# Patient Record
Sex: MALE | Race: BLACK OR AFRICAN AMERICAN | URBAN - METROPOLITAN AREA
[De-identification: names, ages, dates, MRNs, and addresses within clinical notes are randomized per-mention and may not be internally consistent; named-entity substitution may affect disease eponyms.]

---

## 2023-04-05 PROBLEM — 443913008: Status: ACTIVE | Noted: 2023-04-05

## 2023-04-05 PROBLEM — 197321007: Status: ACTIVE | Noted: 2023-04-05

## 2023-04-07 ENCOUNTER — OFFICE VISIT (OUTPATIENT)
Dept: URBAN - METROPOLITAN AREA CLINIC 86 | Facility: CLINIC | Age: 48
End: 2023-04-07
Payer: COMMERCIAL

## 2023-04-07 ENCOUNTER — WEB ENCOUNTER (OUTPATIENT)
Dept: URBAN - METROPOLITAN AREA CLINIC 86 | Facility: CLINIC | Age: 48
End: 2023-04-07

## 2023-04-07 VITALS
HEART RATE: 91 BPM | SYSTOLIC BLOOD PRESSURE: 164 MMHG | WEIGHT: 267 LBS | BODY MASS INDEX: 37.38 KG/M2 | DIASTOLIC BLOOD PRESSURE: 100 MMHG | TEMPERATURE: 97.2 F | HEIGHT: 71 IN

## 2023-04-07 DIAGNOSIS — Z71.85 VACCINE COUNSELING: ICD-10-CM

## 2023-04-07 DIAGNOSIS — K76.0 FATTY LIVER: ICD-10-CM

## 2023-04-07 DIAGNOSIS — R74.8 ELEVATED LIVER ENZYMES: ICD-10-CM

## 2023-04-07 PROBLEM — 707724006: Status: ACTIVE | Noted: 2023-04-07

## 2023-04-07 PROCEDURE — 99204 OFFICE O/P NEW MOD 45 MIN: CPT | Performed by: PHYSICIAN ASSISTANT

## 2023-04-07 RX ORDER — METOPROLOL SUCCINATE 100 MG/1
1 TABLET TABLET, FILM COATED, EXTENDED RELEASE ORAL ONCE A DAY
Status: ACTIVE | COMMUNITY

## 2023-04-07 RX ORDER — LISINOPRIL AND HYDROCHLOROTHIAZIDE 20; 25 MG/1; MG/1
1 TABLET TABLET ORAL ONCE A DAY
Status: ACTIVE | COMMUNITY

## 2023-04-07 NOTE — HPI-TODAY'S VISIT:
This is a 47 year old male referred by Dr. Yvette Velasco for evaluation of fatty liver. A copy of the note will be sent to the referring provider.  4/5/23 no records were sent unfortunately He also had abnormal labs  PMH includes htn, dm II, hld. He was seeing pcp and labs were normal and then went up and she referred him. He is takign lisinopril-hctz 20-25 mg, metoprolol 50 mg, atorvastatin prn , insulin, most recent activated charcol. He was taking atorvastatin he was taking at night x a few weeks. he has been prescribed this for a while but was not taking it regularly until around the time of the labs. takes advil about 2 times/ week, mens on a day mvi ETOH none Denies smoking.  A1c close to 7. Discsussed the fatty liver and treatment for this is diet exercise and weight loss. Very suspicous for the statin as started regularly taking around the time of the labs. end of visit got labs and as 106, alt 176

## 2023-04-07 NOTE — PHYSICAL EXAM HENT:
Head,  normocephalic,  atraumatic,  Face,  Face within normal limits,  Ears,  External ears within normal limits,  Nose/Nasopharynx and mouth: patient wearing mask
n/a

## 2023-04-12 ENCOUNTER — LAB OUTSIDE AN ENCOUNTER (OUTPATIENT)
Dept: URBAN - METROPOLITAN AREA CLINIC 86 | Facility: CLINIC | Age: 48
End: 2023-04-12

## 2023-04-13 LAB
ALBUMIN: 4.8
ALKALINE PHOSPHATASE: 86
ALT (SGPT): 89
AST (SGOT): 55
BILIRUBIN, DIRECT: <0.1
BILIRUBIN, TOTAL: 0.3
PROTEIN, TOTAL: 7.5

## 2023-04-17 ENCOUNTER — LAB OUTSIDE AN ENCOUNTER (OUTPATIENT)
Dept: URBAN - METROPOLITAN AREA CLINIC 86 | Facility: CLINIC | Age: 48
End: 2023-04-17

## 2023-04-21 ENCOUNTER — OFFICE VISIT (OUTPATIENT)
Dept: URBAN - METROPOLITAN AREA CLINIC 86 | Facility: CLINIC | Age: 48
End: 2023-04-21
Payer: COMMERCIAL

## 2023-04-21 VITALS — HEIGHT: 71 IN

## 2023-04-21 DIAGNOSIS — R74.8 ELEVATED LIVER ENZYMES: ICD-10-CM

## 2023-04-21 DIAGNOSIS — K76.0 FATTY LIVER: ICD-10-CM

## 2023-04-21 DIAGNOSIS — Z71.85 VACCINE COUNSELING: ICD-10-CM

## 2023-04-21 PROCEDURE — 99214 OFFICE O/P EST MOD 30 MIN: CPT | Performed by: PHYSICIAN ASSISTANT

## 2023-04-21 RX ORDER — LISINOPRIL AND HYDROCHLOROTHIAZIDE 20; 25 MG/1; MG/1
1 TABLET TABLET ORAL ONCE A DAY
COMMUNITY

## 2023-04-21 RX ORDER — METOPROLOL SUCCINATE 100 MG/1
1 TABLET TABLET, FILM COATED, EXTENDED RELEASE ORAL ONCE A DAY
COMMUNITY

## 2023-04-21 NOTE — PHYSICAL EXAM HENT:
Head, normocephalic, atraumatic, Face, Face within normal limits, Ears, External ears within normal limits, Nose/Nasopharynx and mouth: patient wearing mask

## 2023-04-21 NOTE — HPI-TODAY'S VISIT:
This is a 47 year old male referred by Dr. Yvette Velasco for evaluation of fatty liver. A copy of the note will be sent to the referring provider.   4/21/23 DId not see the labs form the first visit.  He is off the statin.  he is still taking his vitamin  He had labs don on 47/23 and ast  labs on 4/12/23/ ast 55, alt 86  labs on 4/17/23 ast 50, alt 86.  immunoglobulins normal alpha 1 normal. ferritin 422 am negative , laureen negative, asma negative, hep core negative Hapy to see the  labs are better off the medicine and his diet has improved.  Discussed that given the labs were so high and the only thing we changed was the removal of the statin that shows that it was the issue.  THey need to get the US report  recap 4/5/23 no records were sent unfortunately He also had abnormal labs  PMH includes htn, dm II, hld. He was seeing pcp and labs were normal and then went up and she referred him. He is takign lisinopril-hctz 20-25 mg, metoprolol 50 mg, atorvastatin prn , insulin, most recent activated charcol. He was taking atorvastatin he was taking at night x a few weeks. he has been prescribed this for a while but was not taking it regularly until around the time of the labs. takes advil about 2 times/ week, mens on a day mvi ETOH none Denies smoking.  A1c close to 7. Discsussed the fatty liver and treatment for this is diet exercise and weight loss. Very suspicous for the statin as started regularly taking around the time of the labs. end of visit got labs and as 106, alt 176

## 2023-04-26 LAB
A/G RATIO: 1.6
ALBUMIN: 4.6
ALKALINE PHOSPHATASE: 82
ALPHA-1-ANTITRYPSIN, SERUM: 127
ALT (SGPT): 86
ANA DIRECT: NEGATIVE
AST (SGOT): 50
BASO (ABSOLUTE): 0
BASOS: 1
BILIRUBIN, DIRECT: 0.16
BILIRUBIN, TOTAL: 0.5
BUN/CREATININE RATIO: 13
BUN: 16
CALCIUM: 9.7
CARBON DIOXIDE, TOTAL: 27
CERULOPLASMIN: 24.4
CHLORIDE: 98
CREATININE: 1.27
EGFR: 71
EOS (ABSOLUTE): 0.3
EOS: 5
FERRITIN, SERUM: 422
GLOBULIN, TOTAL: 2.9
GLUCOSE: 103
HBSAG SCREEN: NEGATIVE
HCV AB: NON REACTIVE
HEMATOCRIT: 44.6
HEMATOLOGY COMMENTS:: (no result)
HEMOGLOBIN: 14.9
HEP A AB, TOTAL: NEGATIVE
HEP B CORE AB, TOT: NEGATIVE
HEPATITIS B SURF AB QUANT: <3.1
IMMATURE CELLS: (no result)
IMMATURE GRANS (ABS): 0
IMMATURE GRANULOCYTES: 0
IMMUNOGLOBULIN A, QN, SERUM: 189
IMMUNOGLOBULIN E, TOTAL: 476
IMMUNOGLOBULIN G, QN, SERUM: 1559
IMMUNOGLOBULIN M, QN, SERUM: 32
INTERPRETATION:: (no result)
IRON BIND.CAP.(TIBC): 266
IRON SATURATION: 41
IRON: 109
LYMPHS (ABSOLUTE): 2.6
LYMPHS: 42
MCH: 27.7
MCHC: 33.4
MCV: 83
MITOCHONDRIAL (M2) ANTIBODY: <20
MONOCYTES(ABSOLUTE): 0.4
MONOCYTES: 6
NEUTROPHILS (ABSOLUTE): 2.9
NEUTROPHILS: 46
NRBC: (no result)
PHENOTYPE (PI): (no result)
PLATELETS: 317
POTASSIUM: 4.5
PROTEIN, TOTAL: 7.5
RBC: 5.38
RDW: 12.8
SODIUM: 137
UIBC: 157
WBC: 6.2

## 2023-05-19 ENCOUNTER — LAB OUTSIDE AN ENCOUNTER (OUTPATIENT)
Dept: URBAN - METROPOLITAN AREA CLINIC 86 | Facility: CLINIC | Age: 48
End: 2023-05-19

## 2023-05-20 LAB
A/G RATIO: 1.8
ALBUMIN: 4.8
ALKALINE PHOSPHATASE: 92
ALT (SGPT): 117
AST (SGOT): 88
BASO (ABSOLUTE): 0
BASOS: 0
BILIRUBIN, TOTAL: 0.4
BUN/CREATININE RATIO: 12
BUN: 18
CALCIUM: 10
CARBON DIOXIDE, TOTAL: 23
CHLORIDE: 100
CREATININE: 1.49
EGFR: 58
EOS (ABSOLUTE): 0.3
EOS: 4
GLOBULIN, TOTAL: 2.6
GLUCOSE: 113
HEMATOCRIT: 42.9
HEMATOLOGY COMMENTS:: (no result)
HEMOGLOBIN: 14.6
IMMATURE CELLS: (no result)
IMMATURE GRANS (ABS): 0
IMMATURE GRANULOCYTES: 0
LYMPHS (ABSOLUTE): 2.4
LYMPHS: 38
MCH: 28.4
MCHC: 34
MCV: 84
MONOCYTES(ABSOLUTE): 0.4
MONOCYTES: 6
NEUTROPHILS (ABSOLUTE): 3.3
NEUTROPHILS: 52
NRBC: (no result)
PLATELETS: 314
POTASSIUM: 4.3
PROTEIN, TOTAL: 7.4
RBC: 5.14
RDW: 13.5
SODIUM: 140
WBC: 6.3

## 2023-05-26 ENCOUNTER — TELEPHONE ENCOUNTER (OUTPATIENT)
Dept: URBAN - METROPOLITAN AREA CLINIC 92 | Facility: CLINIC | Age: 48
End: 2023-05-26

## 2023-06-02 ENCOUNTER — LAB OUTSIDE AN ENCOUNTER (OUTPATIENT)
Dept: URBAN - METROPOLITAN AREA CLINIC 92 | Facility: CLINIC | Age: 48
End: 2023-06-02

## 2023-06-21 ENCOUNTER — LAB OUTSIDE AN ENCOUNTER (OUTPATIENT)
Dept: URBAN - METROPOLITAN AREA CLINIC 86 | Facility: CLINIC | Age: 48
End: 2023-06-21

## 2023-07-10 ENCOUNTER — LAB OUTSIDE AN ENCOUNTER (OUTPATIENT)
Dept: URBAN - METROPOLITAN AREA CLINIC 86 | Facility: CLINIC | Age: 48
End: 2023-07-10

## 2023-07-25 ENCOUNTER — OFFICE VISIT (OUTPATIENT)
Dept: URBAN - METROPOLITAN AREA CLINIC 86 | Facility: CLINIC | Age: 48
End: 2023-07-25

## 2023-07-25 ENCOUNTER — TELEPHONE ENCOUNTER (OUTPATIENT)
Dept: URBAN - METROPOLITAN AREA CLINIC 86 | Facility: CLINIC | Age: 48
End: 2023-07-25

## 2023-07-25 RX ORDER — METOPROLOL SUCCINATE 100 MG/1
1 TABLET TABLET, FILM COATED, EXTENDED RELEASE ORAL ONCE A DAY
Status: ACTIVE | COMMUNITY

## 2023-07-25 RX ORDER — LISINOPRIL AND HYDROCHLOROTHIAZIDE 20; 25 MG/1; MG/1
1 TABLET TABLET ORAL ONCE A DAY
Status: ACTIVE | COMMUNITY

## 2023-07-25 NOTE — HPI-TODAY'S VISIT:
Dear Jerrell Lerner,  You recently missed an office visit with us on July 25, 2023.  We were seeing you due to the elevated liver enzymes.  Back in May you had labs done with us in the alkaline phosphatase is 92 and the AST was 88 and the ALT was 117 and prior to that in April the alkaline phosphatase was 86 and the AST was 55 and the ALT was 89.  We were curious as to why there was such a change as originally we thought the statin removal was helping the labs.  At the last office visit I had given you lab orders to do on June 21 and July 10 and I have not seen these results.  It is a very important that we update these labs and have you come in for an office visit.  Please contact our office at [phone] and dial [phone] from my medical assistant, Milvia. I will have her contact you as well.   Amina Mccurdy PA-C

## 2023-08-15 ENCOUNTER — OFFICE VISIT (OUTPATIENT)
Dept: URBAN - METROPOLITAN AREA CLINIC 86 | Facility: CLINIC | Age: 48
End: 2023-08-15
Payer: COMMERCIAL

## 2023-08-15 VITALS
DIASTOLIC BLOOD PRESSURE: 88 MMHG | HEART RATE: 68 BPM | TEMPERATURE: 97 F | SYSTOLIC BLOOD PRESSURE: 141 MMHG | HEIGHT: 71 IN | BODY MASS INDEX: 37.03 KG/M2 | WEIGHT: 264.5 LBS

## 2023-08-15 DIAGNOSIS — Z71.85 VACCINE COUNSELING: ICD-10-CM

## 2023-08-15 DIAGNOSIS — R74.8 ELEVATED LIVER ENZYMES: ICD-10-CM

## 2023-08-15 DIAGNOSIS — K76.0 FATTY LIVER: ICD-10-CM

## 2023-08-15 PROCEDURE — 99214 OFFICE O/P EST MOD 30 MIN: CPT | Performed by: PHYSICIAN ASSISTANT

## 2023-08-15 RX ORDER — LISINOPRIL AND HYDROCHLOROTHIAZIDE 20; 25 MG/1; MG/1
1 TABLET TABLET ORAL ONCE A DAY
Status: ACTIVE | COMMUNITY

## 2023-08-15 RX ORDER — ROSUVASTATIN CALCIUM 10 MG/1
1 TABLET TABLET, FILM COATED ORAL ONCE A DAY
Status: ACTIVE | COMMUNITY

## 2023-08-15 RX ORDER — METOPROLOL SUCCINATE 100 MG/1
1 TABLET TABLET, FILM COATED, EXTENDED RELEASE ORAL ONCE A DAY
Status: ACTIVE | COMMUNITY

## 2023-08-15 RX ORDER — METFORMIN HYDROCHLORIDE 500 MG/1
1 TABLET WITH EVENING MEAL TABLET, EXTENDED RELEASE ORAL ONCE A DAY
Status: ACTIVE | COMMUNITY

## 2023-08-15 NOTE — HPI-TODAY'S VISIT:
This is a 47 year old male referred by Dr. Yvette Velasco for evaluation of fatty liver. A copy of the note will be sent to the referring provider.  8/15/23 office visit July 10, 2023 with glucose 157, creatinine 1.18, sodium 138, potassium 4.4, bilirubin 0.4, alkaline phosphatase 79, AST 43, ALT 69.  Previously in May the AST 88 and the ALT was 117.  The total cholesterol 135, triglycerides 83, LDL 64. Hemoglobin A1c was 8 and previously 6.8 in March of this year.   4/21/23 DId not see the labs form the first visit.  He is off the statin.  he is still taking his vitamin  He had labs don on 47/23 and ast  labs on 4/12/23/ ast 55, alt 86  labs on 4/17/23 ast 50, alt 86.  immunoglobulins normal alpha 1 normal. ferritin 422 am negative , laureen negative, asma negative, hep core negative Hapy to see the  labs are better off the medicine and his diet has improved.  Discussed that given the labs were so high and the only thing we changed was the removal of the statin that shows that it was the issue.  THey need to get the US report  recap 4/5/23 no records were sent unfortunately He also had abnormal labs  PMH includes htn, dm II, hld. He was seeing pcp and labs were normal and then went up and she referred him. He is takign lisinopril-hctz 20-25 mg, metoprolol 50 mg, atorvastatin prn , insulin, most recent activated charcol. He was taking atorvastatin he was taking at night x a few weeks. he has been prescribed this for a while but was not taking it regularly until around the time of the labs. takes advil about 2 times/ week, mens on a day mvi ETOH none Denies smoking.  A1c close to 7. Discsussed the fatty liver and treatment for this is diet exercise and weight loss. Very suspicous for the statin as started regularly taking around the time of the labs. end of visit got labs and as 106, alt 176

## 2023-08-16 ENCOUNTER — TELEPHONE ENCOUNTER (OUTPATIENT)
Dept: URBAN - METROPOLITAN AREA CLINIC 86 | Facility: CLINIC | Age: 48
End: 2023-08-16

## 2023-08-16 LAB
ALBUMIN: 4.9
ALKALINE PHOSPHATASE: 87
ALT (SGPT): 85
AST (SGOT): 70
BILIRUBIN, DIRECT: 0.22
BILIRUBIN, TOTAL: 0.5
PROTEIN, TOTAL: 7.5

## 2023-08-16 NOTE — HPI-TODAY'S VISIT:
Dear Jerrell Lerner,  The 8/15/23 labs were sent to me.  The bilirubin total 0.5, alkaline phosphatase 87, AST 70, ALT 85.  Previously the AST was 43 and the ALT was 69 so curious as prior they are higher but it could be due to the blood sugar issue.  Need to really work on that as well as weight loss and diet as you have been doing and if the labs are not dropping we may need to remove the cholesterol medicine.  We will see what the next set of labs show.  Amina Mccurdy PA-C

## 2023-09-26 ENCOUNTER — LAB OUTSIDE AN ENCOUNTER (OUTPATIENT)
Dept: URBAN - METROPOLITAN AREA CLINIC 86 | Facility: CLINIC | Age: 48
End: 2023-09-26

## 2023-09-28 ENCOUNTER — LAB OUTSIDE AN ENCOUNTER (OUTPATIENT)
Dept: URBAN - METROPOLITAN AREA CLINIC 86 | Facility: CLINIC | Age: 48
End: 2023-09-28

## 2023-10-02 ENCOUNTER — OFFICE VISIT (OUTPATIENT)
Dept: URBAN - METROPOLITAN AREA CLINIC 86 | Facility: CLINIC | Age: 48
End: 2023-10-02

## 2023-10-03 ENCOUNTER — OFFICE VISIT (OUTPATIENT)
Dept: URBAN - METROPOLITAN AREA CLINIC 91 | Facility: CLINIC | Age: 48
End: 2023-10-03

## 2023-10-03 RX ORDER — METOPROLOL SUCCINATE 100 MG/1
1 TABLET TABLET, FILM COATED, EXTENDED RELEASE ORAL ONCE A DAY
Status: ACTIVE | COMMUNITY

## 2023-10-03 RX ORDER — METFORMIN HYDROCHLORIDE 500 MG/1
1 TABLET WITH EVENING MEAL TABLET, EXTENDED RELEASE ORAL ONCE A DAY
Status: ACTIVE | COMMUNITY

## 2023-10-03 RX ORDER — ROSUVASTATIN CALCIUM 10 MG/1
1 TABLET TABLET, FILM COATED ORAL ONCE A DAY
Status: ACTIVE | COMMUNITY

## 2023-10-03 RX ORDER — LISINOPRIL AND HYDROCHLOROTHIAZIDE 20; 25 MG/1; MG/1
1 TABLET TABLET ORAL ONCE A DAY
Status: ACTIVE | COMMUNITY

## 2023-10-05 ENCOUNTER — OFFICE VISIT (OUTPATIENT)
Dept: URBAN - METROPOLITAN AREA CLINIC 86 | Facility: CLINIC | Age: 48
End: 2023-10-05

## 2023-10-24 ENCOUNTER — OFFICE VISIT (OUTPATIENT)
Dept: URBAN - METROPOLITAN AREA CLINIC 86 | Facility: CLINIC | Age: 48
End: 2023-10-24
Payer: COMMERCIAL

## 2023-10-24 ENCOUNTER — OFFICE VISIT (OUTPATIENT)
Dept: URBAN - METROPOLITAN AREA CLINIC 91 | Facility: CLINIC | Age: 48
End: 2023-10-24
Payer: COMMERCIAL

## 2023-10-24 VITALS — BODY MASS INDEX: 37.1 KG/M2 | HEIGHT: 71 IN | WEIGHT: 265 LBS

## 2023-10-24 DIAGNOSIS — K76.0 FATTY LIVER: ICD-10-CM

## 2023-10-24 DIAGNOSIS — Z71.85 VACCINE COUNSELING: ICD-10-CM

## 2023-10-24 DIAGNOSIS — R74.8 ELEVATED LIVER ENZYMES: ICD-10-CM

## 2023-10-24 DIAGNOSIS — R93.2 ABNORMAL ULTRASOUND OF LIVER: ICD-10-CM

## 2023-10-24 PROCEDURE — 99214 OFFICE O/P EST MOD 30 MIN: CPT | Performed by: PHYSICIAN ASSISTANT

## 2023-10-24 PROCEDURE — 93975 VASCULAR STUDY: CPT

## 2023-10-24 PROCEDURE — 76705 ECHO EXAM OF ABDOMEN: CPT

## 2023-10-24 RX ORDER — METFORMIN HYDROCHLORIDE 500 MG/1
1 TABLET WITH EVENING MEAL TABLET, EXTENDED RELEASE ORAL ONCE A DAY
Status: ACTIVE | COMMUNITY

## 2023-10-24 RX ORDER — ROSUVASTATIN CALCIUM 10 MG/1
1 TABLET TABLET, FILM COATED ORAL ONCE A DAY
Status: ACTIVE | COMMUNITY

## 2023-10-24 RX ORDER — LISINOPRIL AND HYDROCHLOROTHIAZIDE 20; 25 MG/1; MG/1
1 TABLET TABLET ORAL ONCE A DAY
Status: ACTIVE | COMMUNITY

## 2023-10-24 RX ORDER — METOPROLOL SUCCINATE 100 MG/1
1 TABLET TABLET, FILM COATED, EXTENDED RELEASE ORAL ONCE A DAY
Status: ACTIVE | COMMUNITY

## 2023-10-24 NOTE — HPI-TODAY'S VISIT:
This is a 47 year old male referred by Dr. Yvette Velasco for evaluation of fatty liver. A copy of the note will be sent to the referring provider.  10/24/23 9/26/23 labs with cr 1.35, ast 81, alt 84, previously in august ast 70 alt 85 Denies new meds. stil on crestor discussed need to work on this  needs to have a1c rechecked US prelim showing fatty liver and needs to work on this    recap 8/15/23 office visit July 10, 2023 with glucose 157, creatinine 1.18, sodium 138, potassium 4.4, bilirubin 0.4, alkaline phosphatase 79, AST 43, ALT 69.  Previously in May the AST 88 and the ALT was 117.  The total cholesterol 135, triglycerides 83, LDL 64. Hemoglobin A1c was 8 and previously 6.8 in March of this year.   4/21/23 DId not see the labs form the first visit.  He is off the statin.  he is still taking his vitamin  He had labs don on 47/23 and ast  labs on 4/12/23/ ast 55, alt 86  labs on 4/17/23 ast 50, alt 86.  immunoglobulins normal alpha 1 normal. ferritin 422 am negative , laureen negative, asma negative, hep core negative Hapy to see the  labs are better off the medicine and his diet has improved.  Discussed that given the labs were so high and the only thing we changed was the removal of the statin that shows that it was the issue.  THey need to get the US report  recap 4/5/23 no records were sent unfortunately He also had abnormal labs  PMH includes htn, dm II, hld. He was seeing pcp and labs were normal and then went up and she referred him. He is takign lisinopril-hctz 20-25 mg, metoprolol 50 mg, atorvastatin prn , insulin, most recent activated charcol. He was taking atorvastatin he was taking at night x a few weeks. he has been prescribed this for a while but was not taking it regularly until around the time of the labs. takes advil about 2 times/ week, mens on a day mvi ETOH none Denies smoking.  A1c close to 7. Discsussed the fatty liver and treatment for this is diet exercise and weight loss. Very suspicous for the statin as started regularly taking around the time of the labs. end of visit got labs and as 106, alt 176

## 2023-10-26 ENCOUNTER — TELEPHONE ENCOUNTER (OUTPATIENT)
Dept: URBAN - METROPOLITAN AREA CLINIC 86 | Facility: CLINIC | Age: 48
End: 2023-10-26

## 2023-10-26 NOTE — HPI-TODAY'S VISIT:
Dear Jerrell Lerner,  The final ultrasound report was sent to me. The liver echogenicity appears increased therefore still showing the fatty liver. They did not see any lesions. The common bile duct is normal at 3.6 mm. The gallbladder nomral. THe right kidney normal. The Hepatic vasculature patent. Overall they saw fatty liver and as discussed really need to work on this so we can see the labs improve over time and then the imaging.  Amina Mccurdy PA-C

## 2024-01-23 ENCOUNTER — OFFICE VISIT (OUTPATIENT)
Dept: URBAN - METROPOLITAN AREA CLINIC 86 | Facility: CLINIC | Age: 49
End: 2024-01-23

## 2024-01-23 RX ORDER — METFORMIN HYDROCHLORIDE 500 MG/1
1 TABLET WITH EVENING MEAL TABLET, EXTENDED RELEASE ORAL ONCE A DAY
Status: ACTIVE | COMMUNITY

## 2024-01-23 RX ORDER — METOPROLOL SUCCINATE 100 MG/1
1 TABLET TABLET, FILM COATED, EXTENDED RELEASE ORAL ONCE A DAY
Status: ACTIVE | COMMUNITY

## 2024-01-23 RX ORDER — ROSUVASTATIN CALCIUM 10 MG/1
1 TABLET TABLET, FILM COATED ORAL ONCE A DAY
Status: ACTIVE | COMMUNITY

## 2024-01-23 RX ORDER — LISINOPRIL AND HYDROCHLOROTHIAZIDE 20; 25 MG/1; MG/1
1 TABLET TABLET ORAL ONCE A DAY
Status: ACTIVE | COMMUNITY

## 2024-01-23 NOTE — HPI-TODAY'S VISIT:
This is a 47 year old male referred by Dr. Yvette Velasco for evaluation of fatty liver. A copy of the note will be sent to the referring provider.  1/23/24 ov obtained labs from the [facility] lon. done at [facility] and i imported doc into the chart.1/2/24 labs with na 141, k 4.8, tb 0.5, alp 70, ast 46, alt 54, total chol 132, tg 92, ldl 69,vitam d 14wbc 5.6, hemoglobin 15.2, mcv 85, platelets 311.hemoglobin a1c 7.1 reported that this was imprtoved from 84 on 9/26/23 and alt improved from 81 on 9/26/23  10/24/23 Dear Jerrell Lerner,  The final ultrasound report was sent to me. The liver echogenicity appears increased therefore still showing the fatty liver. They did not see any lesions. The common bile duct is normal at 3.6 mm. The gallbladder nomral. THe right kidney normal. The Hepatic vasculature patent. Overall they saw fatty liver and as discussed really need to work on this so we can see the labs improve over time and then the imaging.  Amina Mccurdy PA-C   9/26/23 labs with cr 1.35, ast 81, alt 84, previously in august ast 70 alt 85 Denies new meds. stil on crestor discussed need to work on this  needs to have a1c rechecked US prelim showing fatty liver and needs to work on this    recap 8/15/23 office visit July 10, 2023 with glucose 157, creatinine 1.18, sodium 138, potassium 4.4, bilirubin 0.4, alkaline phosphatase 79, AST 43, ALT 69.  Previously in May the AST 88 and the ALT was 117.  The total cholesterol 135, triglycerides 83, LDL 64. Hemoglobin A1c was 8 and previously 6.8 in March of this year.   4/21/23 DId not see the labs form the first visit.  He is off the statin.  he is still taking his vitamin  He had labs don on 47/23 and ast  labs on 4/12/23/ ast 55, alt 86  labs on 4/17/23 ast 50, alt 86.  immunoglobulins normal alpha 1 normal. ferritin 422 am negative , laureen negative, asma negative, hep core negative Hapy to see the  labs are better off the medicine and his diet has improved.  Discussed that given the labs were so high and the only thing we changed was the removal of the statin that shows that it was the issue.  THey need to get the US report  recap 4/5/23 no records were sent unfortunately He also had abnormal labs  PMH includes htn, dm II, hld. He was seeing pcp and labs were normal and then went up and she referred him. He is takign lisinopril-hctz 20-25 mg, metoprolol 50 mg, atorvastatin prn , insulin, most recent activated charcol. He was taking atorvastatin he was taking at night x a few weeks. he has been prescribed this for a while but was not taking it regularly until around the time of the labs. takes advil about 2 times/ week, mens on a day mvi ETOH none Denies smoking.  A1c close to 7. Discsussed the fatty liver and treatment for this is diet exercise and weight loss. Very suspicous for the statin as started regularly taking around the time of the labs. end of visit got labs and as 106, alt 176

## 2024-01-24 ENCOUNTER — LAB OUTSIDE AN ENCOUNTER (OUTPATIENT)
Dept: URBAN - METROPOLITAN AREA CLINIC 86 | Facility: CLINIC | Age: 49
End: 2024-01-24

## 2024-01-30 ENCOUNTER — OFFICE VISIT (OUTPATIENT)
Dept: URBAN - METROPOLITAN AREA CLINIC 86 | Facility: CLINIC | Age: 49
End: 2024-01-30

## 2024-01-30 RX ORDER — LISINOPRIL AND HYDROCHLOROTHIAZIDE 20; 25 MG/1; MG/1
1 TABLET TABLET ORAL ONCE A DAY
COMMUNITY

## 2024-01-30 RX ORDER — ROSUVASTATIN CALCIUM 10 MG/1
1 TABLET TABLET, FILM COATED ORAL ONCE A DAY
COMMUNITY

## 2024-01-30 RX ORDER — METFORMIN HYDROCHLORIDE 500 MG/1
1 TABLET WITH EVENING MEAL TABLET, EXTENDED RELEASE ORAL ONCE A DAY
COMMUNITY

## 2024-01-30 RX ORDER — METOPROLOL SUCCINATE 100 MG/1
1 TABLET TABLET, FILM COATED, EXTENDED RELEASE ORAL ONCE A DAY
COMMUNITY

## 2024-01-30 NOTE — HPI-TODAY'S VISIT:
This is a 47 year old male referred by Dr. Yvette Velasco for evaluation of fatty liver. A copy of the note will be sent to the referring provider.  1/30/24 ov obtained labs from the [employer] [facility] lon. done at [facility] and i imported doc into the chart.1/2/24 labs with na 141, k 4.8, tb 0.5, alp 70, ast 46, alt 54, total chol 132, tg 92, ldl 69,vitam d 14wbc 5.6, hemoglobin 15.2, mcv 85, platelets 311.hemoglobin a1c 7.1 reported that this was imprtoved from 84 on 9/26/23 and alt improved from 81 on 9/26/23  10/24/23 Dear Jerrell Lerner,  The final ultrasound report was sent to me. The liver echogenicity appears increased therefore still showing the fatty liver. They did not see any lesions. The common bile duct is normal at 3.6 mm. The gallbladder nomral. THe right kidney normal. The Hepatic vasculature patent. Overall they saw fatty liver and as discussed really need to work on this so we can see the labs improve over time and then the imaging.  Amina Mccurdy PA-C   9/26/23 labs with cr 1.35, ast 81, alt 84, previously in august ast 70 alt 85 Denies new meds. stil on crestor discussed need to work on this  needs to have a1c rechecked US prelim showing fatty liver and needs to work on this    recap 8/15/23 office visit July 10, 2023 with glucose 157, creatinine 1.18, sodium 138, potassium 4.4, bilirubin 0.4, alkaline phosphatase 79, AST 43, ALT 69.  Previously in May the AST 88 and the ALT was 117.  The total cholesterol 135, triglycerides 83, LDL 64. Hemoglobin A1c was 8 and previously 6.8 in March of this year.   4/21/23 DId not see the labs form the first visit.  He is off the statin.  he is still taking his vitamin  He had labs don on 47/23 and ast  labs on 4/12/23/ ast 55, alt 86  labs on 4/17/23 ast 50, alt 86.  immunoglobulins normal alpha 1 normal. ferritin 422 am negative , laureen negative, asma negative, hep core negative Hapy to see the  labs are better off the medicine and his diet has improved.  Discussed that given the labs were so high and the only thing we changed was the removal of the statin that shows that it was the issue.  THey need to get the US report  recap 4/5/23 no records were sent unfortunately He also had abnormal labs  PMH includes htn, dm II, hld. He was seeing pcp and labs were normal and then went up and she referred him. He is takign lisinopril-hctz 20-25 mg, metoprolol 50 mg, atorvastatin prn , insulin, most recent activated charcol. He was taking atorvastatin he was taking at night x a few weeks. he has been prescribed this for a while but was not taking it regularly until around the time of the labs. takes advil about 2 times/ week, mens on a day mvi ETOH none Denies smoking.  A1c close to 7. Discsussed the fatty liver and treatment for this is diet exercise and weight loss. Very suspicous for the statin as started regularly taking around the time of the labs. end of visit got labs and as 106, alt 176

## 2024-02-05 ENCOUNTER — OV EP (OUTPATIENT)
Dept: URBAN - METROPOLITAN AREA CLINIC 86 | Facility: CLINIC | Age: 49
End: 2024-02-05

## 2024-02-05 RX ORDER — LISINOPRIL AND HYDROCHLOROTHIAZIDE 20; 25 MG/1; MG/1
1 TABLET TABLET ORAL ONCE A DAY
COMMUNITY

## 2024-02-05 RX ORDER — ROSUVASTATIN CALCIUM 10 MG/1
1 TABLET TABLET, FILM COATED ORAL ONCE A DAY
COMMUNITY

## 2024-02-05 RX ORDER — METFORMIN HYDROCHLORIDE 500 MG/1
1 TABLET WITH EVENING MEAL TABLET, EXTENDED RELEASE ORAL ONCE A DAY
COMMUNITY

## 2024-02-05 RX ORDER — METOPROLOL SUCCINATE 100 MG/1
1 TABLET TABLET, FILM COATED, EXTENDED RELEASE ORAL ONCE A DAY
COMMUNITY

## 2024-02-05 NOTE — HPI-TODAY'S VISIT:
This is a 47 year old male referred by Dr. Yvette Velasco for evaluation of fatty liver. A copy of the note will be sent to the referring provider.  1/30/24 ov obtained labs from the [employer] lon. done at [facility] and i imported doc into the chart.1/2/24 labs with na 141, k 4.8, tb 0.5, alp 70, ast 46, alt 54, total chol 132, tg 92, ldl 69,vitam d 14wbc 5.6, hemoglobin 15.2, mcv 85, platelets 311.hemoglobin a1c 7.1 reported that this was imprtoved from 84 on 9/26/23 and alt improved from 81 on 9/26/23  10/24/23 Dear Jerrell Lerner,  The final ultrasound report was sent to me. The liver echogenicity appears increased therefore still showing the fatty liver. They did not see any lesions. The common bile duct is normal at 3.6 mm. The gallbladder nomral. THe right kidney normal. The Hepatic vasculature patent. Overall they saw fatty liver and as discussed really need to work on this so we can see the labs improve over time and then the imaging.  Amina Mcucrdy PA-C   9/26/23 labs with cr 1.35, ast 81, alt 84, previously in august ast 70 alt 85 Denies new meds. stil on crestor discussed need to work on this  needs to have a1c rechecked US prelim showing fatty liver and needs to work on this    recap 8/15/23 office visit July 10, 2023 with glucose 157, creatinine 1.18, sodium 138, potassium 4.4, bilirubin 0.4, alkaline phosphatase 79, AST 43, ALT 69.  Previously in May the AST 88 and the ALT was 117.  The total cholesterol 135, triglycerides 83, LDL 64. Hemoglobin A1c was 8 and previously 6.8 in March of this year.   4/21/23 DId not see the labs form the first visit.  He is off the statin.  he is still taking his vitamin  He had labs don on 47/23 and ast  labs on 4/12/23/ ast 55, alt 86  labs on 4/17/23 ast 50, alt 86.  immunoglobulins normal alpha 1 normal. ferritin 422 am negative , laureen negative, asma negative, hep core negative Hapy to see the  labs are better off the medicine and his diet has improved.  Discussed that given the labs were so high and the only thing we changed was the removal of the statin that shows that it was the issue.  THey need to get the US report  recap 4/5/23 no records were sent unfortunately He also had abnormal labs  PMH includes htn, dm II, hld. He was seeing pcp and labs were normal and then went up and she referred him. He is takign lisinopril-hctz 20-25 mg, metoprolol 50 mg, atorvastatin prn , insulin, most recent activated charcol. He was taking atorvastatin he was taking at night x a few weeks. he has been prescribed this for a while but was not taking it regularly until around the time of the labs. takes advil about 2 times/ week, mens on a day mvi ETOH none Denies smoking.  A1c close to 7. Discsussed the fatty liver and treatment for this is diet exercise and weight loss. Very suspicous for the statin as started regularly taking around the time of the labs. end of visit got labs and as 106, alt 176

## 2024-02-13 ENCOUNTER — OV EP (OUTPATIENT)
Dept: URBAN - METROPOLITAN AREA CLINIC 86 | Facility: CLINIC | Age: 49
End: 2024-02-13

## 2024-02-13 RX ORDER — ROSUVASTATIN CALCIUM 10 MG/1
1 TABLET TABLET, FILM COATED ORAL ONCE A DAY
COMMUNITY

## 2024-02-13 RX ORDER — LISINOPRIL AND HYDROCHLOROTHIAZIDE 20; 25 MG/1; MG/1
1 TABLET TABLET ORAL ONCE A DAY
COMMUNITY

## 2024-02-13 RX ORDER — METFORMIN HYDROCHLORIDE 500 MG/1
1 TABLET WITH EVENING MEAL TABLET, EXTENDED RELEASE ORAL ONCE A DAY
COMMUNITY

## 2024-02-13 RX ORDER — METOPROLOL SUCCINATE 100 MG/1
1 TABLET TABLET, FILM COATED, EXTENDED RELEASE ORAL ONCE A DAY
COMMUNITY

## 2024-02-13 NOTE — HPI-TODAY'S VISIT:
This is a 47 year old male referred by Dr. Yvette Velasco for evaluation of fatty liver. A copy of the note will be sent to the referring provider.  1/30/24 ov obtained labs from the [facility] lon. done at [facility] and i imported doc into the chart.1/2/24 labs with na 141, k 4.8, tb 0.5, alp 70, ast 46, alt 54, total chol 132, tg 92, ldl 69,vitam d 14wbc 5.6, hemoglobin 15.2, mcv 85, platelets 311.hemoglobin a1c 7.1 reported that this was imprtoved from 84 on 9/26/23 and alt improved from 81 on 9/26/23  10/24/23 Dear Jerrell Lerner,  The final ultrasound report was sent to me. The liver echogenicity appears increased therefore still showing the fatty liver. They did not see any lesions. The common bile duct is normal at 3.6 mm. The gallbladder nomral. THe right kidney normal. The Hepatic vasculature patent. Overall they saw fatty liver and as discussed really need to work on this so we can see the labs improve over time and then the imaging.  Amina Mccurdy PA-C   9/26/23 labs with cr 1.35, ast 81, alt 84, previously in august ast 70 alt 85 Denies new meds. stil on crestor discussed need to work on this  needs to have a1c rechecked US prelim showing fatty liver and needs to work on this    recap 8/15/23 office visit July 10, 2023 with glucose 157, creatinine 1.18, sodium 138, potassium 4.4, bilirubin 0.4, alkaline phosphatase 79, AST 43, ALT 69.  Previously in May the AST 88 and the ALT was 117.  The total cholesterol 135, triglycerides 83, LDL 64. Hemoglobin A1c was 8 and previously 6.8 in March of this year.   4/21/23 DId not see the labs form the first visit.  He is off the statin.  he is still taking his vitamin  He had labs don on 47/23 and ast  labs on 4/12/23/ ast 55, alt 86  labs on 4/17/23 ast 50, alt 86.  immunoglobulins normal alpha 1 normal. ferritin 422 am negative , laureen negative, asma negative, hep core negative Hapy to see the  labs are better off the medicine and his diet has improved.  Discussed that given the labs were so high and the only thing we changed was the removal of the statin that shows that it was the issue.  THey need to get the US report  recap 4/5/23 no records were sent unfortunately He also had abnormal labs  PMH includes htn, dm II, hld. He was seeing pcp and labs were normal and then went up and she referred him. He is takign lisinopril-hctz 20-25 mg, metoprolol 50 mg, atorvastatin prn , insulin, most recent activated charcol. He was taking atorvastatin he was taking at night x a few weeks. he has been prescribed this for a while but was not taking it regularly until around the time of the labs. takes advil about 2 times/ week, mens on a day mvi ETOH none Denies smoking.  A1c close to 7. Discsussed the fatty liver and treatment for this is diet exercise and weight loss. Very suspicous for the statin as started regularly taking around the time of the labs. end of visit got labs and as 106, alt 176

## 2024-02-20 ENCOUNTER — OV EP (OUTPATIENT)
Dept: URBAN - METROPOLITAN AREA CLINIC 86 | Facility: CLINIC | Age: 49
End: 2024-02-20
Payer: COMMERCIAL

## 2024-02-20 VITALS
DIASTOLIC BLOOD PRESSURE: 85 MMHG | HEIGHT: 71 IN | TEMPERATURE: 96.8 F | WEIGHT: 274.6 LBS | HEART RATE: 62 BPM | BODY MASS INDEX: 38.44 KG/M2 | SYSTOLIC BLOOD PRESSURE: 138 MMHG

## 2024-02-20 DIAGNOSIS — K76.0 FATTY LIVER: ICD-10-CM

## 2024-02-20 DIAGNOSIS — Z71.85 VACCINE COUNSELING: ICD-10-CM

## 2024-02-20 DIAGNOSIS — R74.8 ELEVATED LIVER ENZYMES: ICD-10-CM

## 2024-02-20 PROCEDURE — 99214 OFFICE O/P EST MOD 30 MIN: CPT | Performed by: PHYSICIAN ASSISTANT

## 2024-02-20 RX ORDER — METOPROLOL SUCCINATE 100 MG/1
1 TABLET TABLET, FILM COATED, EXTENDED RELEASE ORAL ONCE A DAY
COMMUNITY

## 2024-02-20 RX ORDER — ROSUVASTATIN CALCIUM 10 MG/1
1 TABLET TABLET, FILM COATED ORAL ONCE A DAY
COMMUNITY

## 2024-02-20 RX ORDER — METFORMIN HYDROCHLORIDE 500 MG/1
1 TABLET WITH EVENING MEAL TABLET, EXTENDED RELEASE ORAL ONCE A DAY
COMMUNITY

## 2024-02-20 RX ORDER — LISINOPRIL AND HYDROCHLOROTHIAZIDE 20; 25 MG/1; MG/1
1 TABLET TABLET ORAL ONCE A DAY
COMMUNITY

## 2024-02-20 NOTE — HPI-TODAY'S VISIT:
This is a 47 year old male referred by Dr. Yvette Velasco for evaluation of fatty liver. A copy of the note will be sent to the referring provider.  1/30/24 ov obtained labs from the [facility] lon. done at [facility] and i imported doc into the chart.1/2/24 labs with na 141, k 4.8, tb 0.5, alp 70, ast 46, alt 54, total chol 132, tg 92, ldl 69,vitam d 14wbc 5.6, hemoglobin 15.2, mcv 85, platelets 311.hemoglobin a1c 7.1 reported that this was imprtoved from 8.4 on 9/26/23 and alt improved from 81 on 9/26/23  he is now on crestor and cholesterol is lower.  his a1c is lower at 7.1 and he will continue to work weight up and still needs to work on this    recap 10/24/23 Dear Jerrell Lerner,  The final ultrasound report was sent to me. The liver echogenicity appears increased therefore still showing the fatty liver. They did not see any lesions. The common bile duct is normal at 3.6 mm. The gallbladder nomral. THe right kidney normal. The Hepatic vasculature patent. Overall they saw fatty liver and as discussed really need to work on this so we can see the labs improve over time and then the imaging.  Amina Mccurdy PA-C   9/26/23 labs with cr 1.35, ast 81, alt 84, previously in august ast 70 alt 85 Denies new meds. stil on crestor discussed need to work on this  needs to have a1c rechecked US prelim showing fatty liver and needs to work on this    recap 8/15/23 office visit July 10, 2023 with glucose 157, creatinine 1.18, sodium 138, potassium 4.4, bilirubin 0.4, alkaline phosphatase 79, AST 43, ALT 69.  Previously in May the AST 88 and the ALT was 117.  The total cholesterol 135, triglycerides 83, LDL 64. Hemoglobin A1c was 8 and previously 6.8 in March of this year.   4/21/23 DId not see the labs form the first visit.  He is off the statin.  he is still taking his vitamin  He had labs don on 47/23 and ast  labs on 4/12/23/ ast 55, alt 86  labs on 4/17/23 ast 50, alt 86.  immunoglobulins normal alpha 1 normal. ferritin 422 am negative , laureen negative, asma negative, hep core negative Hapy to see the  labs are better off the medicine and his diet has improved.  Discussed that given the labs were so high and the only thing we changed was the removal of the statin that shows that it was the issue.  THey need to get the US report  recap 4/5/23 no records were sent unfortunately He also had abnormal labs  PMH includes htn, dm II, hld. He was seeing pcp and labs were normal and then went up and she referred him. He is takign lisinopril-hctz 20-25 mg, metoprolol 50 mg, atorvastatin prn , insulin, most recent activated charcol. He was taking atorvastatin he was taking at night x a few weeks. he has been prescribed this for a while but was not taking it regularly until around the time of the labs. takes advil about 2 times/ week, mens on a day mvi ETOH none Denies smoking.  A1c close to 7. Discsussed the fatty liver and treatment for this is diet exercise and weight loss. Very suspicous for the statin as started regularly taking around the time of the labs. end of visit got labs and as 106, alt 176

## 2024-04-02 ENCOUNTER — LAB (OUTPATIENT)
Dept: URBAN - METROPOLITAN AREA CLINIC 86 | Facility: CLINIC | Age: 49
End: 2024-04-02

## 2024-04-08 ENCOUNTER — LAB (OUTPATIENT)
Dept: URBAN - METROPOLITAN AREA CLINIC 86 | Facility: CLINIC | Age: 49
End: 2024-04-08

## 2024-04-09 ENCOUNTER — US W/ DOPP (OUTPATIENT)
Dept: URBAN - METROPOLITAN AREA CLINIC 91 | Facility: CLINIC | Age: 49
End: 2024-04-09

## 2024-04-09 ENCOUNTER — OV EP (OUTPATIENT)
Dept: URBAN - METROPOLITAN AREA CLINIC 86 | Facility: CLINIC | Age: 49
End: 2024-04-09

## 2024-04-25 ENCOUNTER — LAB (OUTPATIENT)
Dept: URBAN - METROPOLITAN AREA CLINIC 86 | Facility: CLINIC | Age: 49
End: 2024-04-25

## 2024-07-16 ENCOUNTER — OFFICE VISIT (OUTPATIENT)
Dept: URBAN - METROPOLITAN AREA CLINIC 91 | Facility: CLINIC | Age: 49
End: 2024-07-16
Payer: COMMERCIAL

## 2024-07-16 DIAGNOSIS — K76.0 FATTY (CHANGE OF) LIVER: ICD-10-CM

## 2024-07-16 DIAGNOSIS — K82.4 GALLBLADDER POLYP: ICD-10-CM

## 2024-07-16 PROCEDURE — 76705 ECHO EXAM OF ABDOMEN: CPT

## 2024-07-16 PROCEDURE — 93975 VASCULAR STUDY: CPT

## 2024-07-24 ENCOUNTER — TELEPHONE ENCOUNTER (OUTPATIENT)
Dept: URBAN - METROPOLITAN AREA CLINIC 86 | Facility: CLINIC | Age: 49
End: 2024-07-24

## 2024-07-24 NOTE — HPI-TODAY'S VISIT:
Dear Jerrell Lerner,    The 7/16/24 ultra sound was sent to me.  The liver with fatty infiltration.  They did not see any gallstones.  They did see a gallbladder polyp that was approximately 4 mm and we will continue to monitor this.  They did not see ductal dilatation.  Common bile duct 4.5 mm and this is normal.  Right kidney normal.  The spleen is 10.3 x 4.1 cm.  Hepatic vascular patent.  Overall I saw fatty liver and a 4 mm gallbladder polyp.  Still need to continue to work on the fatty liver.  We will review this at your follow-up and see how you are doing.   Amina Mccurdy PA-C

## 2024-08-05 ENCOUNTER — DASHBOARD ENCOUNTERS (OUTPATIENT)
Age: 49
End: 2024-08-05

## 2024-08-05 ENCOUNTER — OFFICE VISIT (OUTPATIENT)
Dept: URBAN - METROPOLITAN AREA CLINIC 86 | Facility: CLINIC | Age: 49
End: 2024-08-05
Payer: COMMERCIAL

## 2024-08-05 VITALS
HEIGHT: 71 IN | BODY MASS INDEX: 37.8 KG/M2 | DIASTOLIC BLOOD PRESSURE: 99 MMHG | SYSTOLIC BLOOD PRESSURE: 155 MMHG | HEART RATE: 64 BPM | WEIGHT: 270 LBS | TEMPERATURE: 96.9 F

## 2024-08-05 DIAGNOSIS — K76.0 FATTY LIVER: ICD-10-CM

## 2024-08-05 DIAGNOSIS — R74.8 ELEVATED LIVER ENZYMES: ICD-10-CM

## 2024-08-05 PROCEDURE — 99214 OFFICE O/P EST MOD 30 MIN: CPT | Performed by: PHYSICIAN ASSISTANT

## 2024-08-05 RX ORDER — LISINOPRIL AND HYDROCHLOROTHIAZIDE 20; 25 MG/1; MG/1
1 TABLET TABLET ORAL ONCE A DAY
COMMUNITY

## 2024-08-05 RX ORDER — ROSUVASTATIN CALCIUM 10 MG/1
1 TABLET TABLET, FILM COATED ORAL ONCE A DAY
Status: ACTIVE | COMMUNITY

## 2024-08-05 RX ORDER — METOPROLOL SUCCINATE 100 MG/1
1 TABLET TABLET, FILM COATED, EXTENDED RELEASE ORAL ONCE A DAY
Status: ACTIVE | COMMUNITY

## 2024-08-05 NOTE — HPI-TODAY'S VISIT:
This is a 47 year old male referred by Dr. Yvette Velasco for evaluation of fatty liver. A copy of the note will be sent to the referring provider.   8/5/24 The 7/16/24 ultra sound was sent to me. The liver with fatty infiltration. They did not see any gallstones. They did see a gallbladder polyp that was approximately 4 mm and we will continue to monitor this. They did not see ductal dilatation. Common bile duct 4.5 mm and this is normal. Right kidney normal. The spleen is 10.3 x 4.1 cm. Hepatic vascular patent. Overall I saw fatty liver and a 4 mm gallbladder polyp. Still need to continue to work on the fatty liver. We will review this at your follow-up and see how you are doing.   april pcp labs iwth ast 31 and alt 39 he stopped drinking coke and this is good lost 4 lbs  a1c 7 and still working on this    recap 1/30/24 ov obtained labs from the [facility] lon. done at [facility] and i imported doc into the chart.1/2/24 labs with na 141, k 4.8, tb 0.5, alp 70, ast 46, alt 54, total chol 132, tg 92, ldl 69,vitam d 14wbc 5.6, hemoglobin 15.2, mcv 85, platelets 311.hemoglobin a1c 7.1 reported that this was imprtoved from 8.4 on 9/26/23 and alt improved from 81 on 9/26/23  he is now on crestor and cholesterol is lower.  his a1c is lower at 7.1 and he will continue to work weight up and still needs to work on this   10/24/23 Dear Jerrell Lerner,  The final ultrasound report was sent to me. The liver echogenicity appears increased therefore still showing the fatty liver. They did not see any lesions. The common bile duct is normal at 3.6 mm. The gallbladder nomral. THe right kidney normal. The Hepatic vasculature patent. Overall they saw fatty liver and as discussed really need to work on this so we can see the labs improve over time and then the imaging.  Amina Mccurdy PA-C   9/26/23 labs with cr 1.35, ast 81, alt 84, previously in august ast 70 alt 85 Denies new meds. stil on crestor discussed need to work on this  needs to have a1c rechecked US prelim showing fatty liver and needs to work on this    recap 8/15/23 office visit July 10, 2023 with glucose 157, creatinine 1.18, sodium 138, potassium 4.4, bilirubin 0.4, alkaline phosphatase 79, AST 43, ALT 69.  Previously in May the AST 88 and the ALT was 117.  The total cholesterol 135, triglycerides 83, LDL 64. Hemoglobin A1c was 8 and previously 6.8 in March of this year.   4/21/23 DId not see the labs form the first visit.  He is off the statin.  he is still taking his vitamin  He had labs don on 47/23 and ast  labs on 4/12/23/ ast 55, alt 86  labs on 4/17/23 ast 50, alt 86.  immunoglobulins normal alpha 1 normal. ferritin 422 am negative , laureen negative, asma negative, hep core negative Hapy to see the  labs are better off the medicine and his diet has improved.  Discussed that given the labs were so high and the only thing we changed was the removal of the statin that shows that it was the issue.  THey need to get the US report   4/5/23 no records were sent unfortunately He also had abnormal labs  PMH includes htn, dm II, hld. He was seeing pcp and labs were normal and then went up and she referred him. He is takign lisinopril-hctz 20-25 mg, metoprolol 50 mg, atorvastatin prn , insulin, most recent activated charcol. He was taking atorvastatin he was taking at night x a few weeks. he has been prescribed this for a while but was not taking it regularly until around the time of the labs. takes advil about 2 times/ week, mens on a day mvi ETOH none Denies smoking.  A1c close to 7. Discsussed the fatty liver and treatment for this is diet exercise and weight loss. Very suspicous for the statin as started regularly taking around the time of the labs. end of visit got labs and as 106, alt 176

## 2025-01-06 ENCOUNTER — LAB OUTSIDE AN ENCOUNTER (OUTPATIENT)
Dept: URBAN - METROPOLITAN AREA CLINIC 86 | Facility: CLINIC | Age: 50
End: 2025-01-06

## 2025-02-05 ENCOUNTER — TELEPHONE ENCOUNTER (OUTPATIENT)
Dept: URBAN - METROPOLITAN AREA CLINIC 5 | Facility: CLINIC | Age: 50
End: 2025-02-05

## 2025-02-10 ENCOUNTER — LAB OUTSIDE AN ENCOUNTER (OUTPATIENT)
Dept: URBAN - METROPOLITAN AREA CLINIC 86 | Facility: CLINIC | Age: 50
End: 2025-02-10

## 2025-02-10 ENCOUNTER — TELEPHONE ENCOUNTER (OUTPATIENT)
Dept: URBAN - METROPOLITAN AREA CLINIC 86 | Facility: CLINIC | Age: 50
End: 2025-02-10

## 2025-02-11 ENCOUNTER — OFFICE VISIT (OUTPATIENT)
Dept: URBAN - METROPOLITAN AREA CLINIC 86 | Facility: CLINIC | Age: 50
End: 2025-02-11

## 2025-04-08 ENCOUNTER — OFFICE VISIT (OUTPATIENT)
Dept: URBAN - METROPOLITAN AREA CLINIC 85 | Facility: CLINIC | Age: 50
End: 2025-04-08

## 2025-04-15 ENCOUNTER — OFFICE VISIT (OUTPATIENT)
Dept: URBAN - METROPOLITAN AREA CLINIC 85 | Facility: CLINIC | Age: 50
End: 2025-04-15
Payer: COMMERCIAL

## 2025-04-15 DIAGNOSIS — K76.0 STEATOSIS OF LIVER: ICD-10-CM

## 2025-04-15 PROCEDURE — 76981 USE PARENCHYMA: CPT | Performed by: INTERNAL MEDICINE

## 2025-04-15 RX ORDER — METOPROLOL SUCCINATE 100 MG/1
1 TABLET TABLET, FILM COATED, EXTENDED RELEASE ORAL ONCE A DAY
Status: ACTIVE | COMMUNITY

## 2025-04-15 RX ORDER — LISINOPRIL AND HYDROCHLOROTHIAZIDE 20; 25 MG/1; MG/1
1 TABLET TABLET ORAL ONCE A DAY
COMMUNITY

## 2025-04-15 RX ORDER — ROSUVASTATIN CALCIUM 10 MG/1
1 TABLET TABLET, FILM COATED ORAL ONCE A DAY
Status: ACTIVE | COMMUNITY

## 2025-04-16 ENCOUNTER — CLAIMS CREATED FROM THE CLAIM WINDOW (OUTPATIENT)
Dept: URBAN - METROPOLITAN AREA CLINIC 117 | Facility: CLINIC | Age: 50
End: 2025-04-16
Payer: COMMERCIAL

## 2025-04-16 DIAGNOSIS — K76.0 STEATOSIS OF LIVER: ICD-10-CM

## 2025-04-16 PROCEDURE — 76981 USE PARENCHYMA: CPT | Performed by: INTERNAL MEDICINE

## 2025-04-17 ENCOUNTER — TELEPHONE ENCOUNTER (OUTPATIENT)
Dept: URBAN - METROPOLITAN AREA CLINIC 86 | Facility: CLINIC | Age: 50
End: 2025-04-17

## 2025-04-17 NOTE — HPI-TODAY'S VISIT:
Jerrell Cerna,  The recent FibroScan was sent to me.  They noted that there was severe fat and the CAP score was 313 which is consistent with this.  They did not see significant fibrosis.  It would be nice to have some blood work to correlate this with you.  Sometimes if there is severe fat it can skew the results.  I will have the office get a visit set up for us to review this in person.  Please have your primary care doctor fax us the recent blood work. Amina Mccurdy PA-C

## 2025-04-28 ENCOUNTER — OFFICE VISIT (OUTPATIENT)
Dept: URBAN - METROPOLITAN AREA CLINIC 86 | Facility: CLINIC | Age: 50
End: 2025-04-28
Payer: COMMERCIAL

## 2025-04-28 DIAGNOSIS — R74.8 ELEVATED LIVER ENZYMES: ICD-10-CM

## 2025-04-28 DIAGNOSIS — K76.0 FATTY LIVER: ICD-10-CM

## 2025-04-28 DIAGNOSIS — Z71.85 VACCINE COUNSELING: ICD-10-CM

## 2025-04-28 PROCEDURE — 99214 OFFICE O/P EST MOD 30 MIN: CPT | Performed by: PHYSICIAN ASSISTANT

## 2025-04-28 RX ORDER — METOPROLOL SUCCINATE 100 MG/1
1 TABLET TABLET, FILM COATED, EXTENDED RELEASE ORAL ONCE A DAY
Status: ACTIVE | COMMUNITY

## 2025-04-28 RX ORDER — ROSUVASTATIN CALCIUM 20 MG/1
1 TABLET TABLET, COATED ORAL ONCE A DAY
Status: ACTIVE | COMMUNITY

## 2025-04-28 RX ORDER — LISINOPRIL AND HYDROCHLOROTHIAZIDE 20; 25 MG/1; MG/1
1 TABLET TABLET ORAL ONCE A DAY
COMMUNITY

## 2025-04-28 RX ORDER — ROSUVASTATIN CALCIUM 10 MG/1
1 TABLET TABLET, FILM COATED ORAL ONCE A DAY
Status: ACTIVE | COMMUNITY

## 2025-04-28 RX ORDER — HUMAN INSULIN 100 [IU]/ML
AS DIRECTED INJECTION, SUSPENSION SUBCUTANEOUS
Status: ACTIVE | COMMUNITY

## 2025-04-28 NOTE — HPI-TODAY'S VISIT:
This is a 47 year old male referred by Dr. Yvette Velasco for evaluation of fatty liver. A copy of the note will be sent to the referring provider.  4/28/25 Jerrell Cerna,  The recent FibroScan was sent to me.  They noted that there was severe fat and the CAP score was 313 which is consistent with this.  They did not see significant fibrosis.  It would be nice to have some blood work to correlate this with you.  Sometimes if there is severe fat it can skew the results.  I will have the office get a visit set up for us to review this in person.  Please have your primary care doctor fax us the recent blood work. Amina Mccurdy PA-C  hemoglobin 15.5, platelets 297,  glu 115, cr 1.26, tb 0.5, 66 ast 31 and alt 39 he has lost weight , happy about this  cut down on bread and red meat and sugars   recap  8/5/24 The 7/16/24 ultra sound was sent to me. The liver with fatty infiltration. They did not see any gallstones. They did see a gallbladder polyp that was approximately 4 mm and we will continue to monitor this. They did not see ductal dilatation. Common bile duct 4.5 mm and this is normal. Right kidney normal. The spleen is 10.3 x 4.1 cm. Hepatic vascular patent. Overall I saw fatty liver and a 4 mm gallbladder polyp. Still need to continue to work on the fatty liver. We will review this at your follow-up and see how you are doing.   april pcp labs iwth ast 31 and alt 39 he stopped drinking coke and this is good lost 4 lbs  a1c 7 and still working on this    recap 1/30/24 ov obtained labs from the [facility] lon. done at [facility] and i imported doc into the chart.1/2/24 labs with na 141, k 4.8, tb 0.5, alp 70, ast 46, alt 54, total chol 132, tg 92, ldl 69,vitam d 14wbc 5.6, hemoglobin 15.2, mcv 85, platelets 311.hemoglobin a1c 7.1 reported that this was imprtoved from 8.4 on 9/26/23 and alt improved from 81 on 9/26/23  he is now on crestor and cholesterol is lower.  his a1c is lower at 7.1 and he will continue to work weight up and still needs to work on this   10/24/23 Dear Jerrell Lerner,  The final ultrasound report was sent to me. The liver echogenicity appears increased therefore still showing the fatty liver. They did not see any lesions. The common bile duct is normal at 3.6 mm. The gallbladder nomral. THe right kidney normal. The Hepatic vasculature patent. Overall they saw fatty liver and as discussed really need to work on this so we can see the labs improve over time and then the imaging.  Amina Mccurdy PA-C   9/26/23 labs with cr 1.35, ast 81, alt 84, previously in august ast 70 alt 85 Denies new meds. larissa hernandez crestor discussed need to work on this  needs to have a1c rechecked US prelim showing fatty liver and needs to work on this    recap 8/15/23 office visit July 10, 2023 with glucose 157, creatinine 1.18, sodium 138, potassium 4.4, bilirubin 0.4, alkaline phosphatase 79, AST 43, ALT 69.  Previously in May the AST 88 and the ALT was 117.  The total cholesterol 135, triglycerides 83, LDL 64. Hemoglobin A1c was 8 and previously 6.8 in March of this year.   4/21/23 DId not see the labs form the first visit.  He is off the statin.  he is still taking his vitamin  He had labs don on 47/23 and ast  labs on 4/12/23/ ast 55, alt 86  labs on 4/17/23 ast 50, alt 86.  immunoglobulins normal alpha 1 normal. ferritin 422 am negative , laureen negative, asma negative, hep core negative Hapy to see the  labs are better off the medicine and his diet has improved.  Discussed that given the labs were so high and the only thing we changed was the removal of the statin that shows that it was the issue.  THey need to get the US report   4/5/23 no records were sent unfortunately He also had abnormal labs  PMH includes htn, dm II, hld. He was seeing pcp and labs were normal and then went up and she referred him. He is takign lisinopril-hctz 20-25 mg, metoprolol 50 mg, atorvastatin prn , insulin, most recent activated charcol. He was taking atorvastatin he was taking at night x a few weeks. he has been prescribed this for a while but was not taking it regularly until around the time of the labs. takes advil about 2 times/ week, mens on a day mvi ETOH none Denies smoking.  A1c close to 7. Discsussed the fatty liver and treatment for this is diet exercise and weight loss. Very suspicous for the statin as started regularly taking around the time of the labs. end of visit got labs and as 106, alt 176

## 2025-05-06 ENCOUNTER — OFFICE VISIT (OUTPATIENT)
Dept: URBAN - METROPOLITAN AREA CLINIC 85 | Facility: CLINIC | Age: 50
End: 2025-05-06